# Patient Record
Sex: MALE | Race: BLACK OR AFRICAN AMERICAN | NOT HISPANIC OR LATINO | URBAN - METROPOLITAN AREA
[De-identification: names, ages, dates, MRNs, and addresses within clinical notes are randomized per-mention and may not be internally consistent; named-entity substitution may affect disease eponyms.]

---

## 2019-06-06 ENCOUNTER — EMERGENCY (EMERGENCY)
Facility: HOSPITAL | Age: 24
LOS: 1 days | Discharge: HOME | End: 2019-06-06
Attending: EMERGENCY MEDICINE | Admitting: EMERGENCY MEDICINE
Payer: SELF-PAY

## 2019-06-06 VITALS
SYSTOLIC BLOOD PRESSURE: 110 MMHG | TEMPERATURE: 98 F | DIASTOLIC BLOOD PRESSURE: 75 MMHG | RESPIRATION RATE: 16 BRPM | OXYGEN SATURATION: 97 % | HEART RATE: 74 BPM

## 2019-06-06 VITALS
HEART RATE: 78 BPM | OXYGEN SATURATION: 97 % | RESPIRATION RATE: 16 BRPM | SYSTOLIC BLOOD PRESSURE: 111 MMHG | TEMPERATURE: 99 F | DIASTOLIC BLOOD PRESSURE: 73 MMHG

## 2019-06-06 PROCEDURE — 71046 X-RAY EXAM CHEST 2 VIEWS: CPT

## 2019-06-06 PROCEDURE — 93005 ELECTROCARDIOGRAM TRACING: CPT

## 2019-06-06 PROCEDURE — 93010 ELECTROCARDIOGRAM REPORT: CPT

## 2019-06-06 PROCEDURE — 73110 X-RAY EXAM OF WRIST: CPT

## 2019-06-06 PROCEDURE — 99283 EMERGENCY DEPT VISIT LOW MDM: CPT | Mod: 25

## 2019-06-06 PROCEDURE — 73110 X-RAY EXAM OF WRIST: CPT | Mod: 26,RT

## 2019-06-06 PROCEDURE — 99284 EMERGENCY DEPT VISIT MOD MDM: CPT | Mod: 25

## 2019-06-06 PROCEDURE — 71046 X-RAY EXAM CHEST 2 VIEWS: CPT | Mod: 26

## 2019-06-06 RX ORDER — IBUPROFEN 200 MG
600 TABLET ORAL ONCE
Refills: 0 | Status: COMPLETED | OUTPATIENT
Start: 2019-06-06 | End: 2019-06-06

## 2019-06-06 RX ADMIN — Medication 600 MILLIGRAM(S): at 08:59

## 2019-06-06 RX ADMIN — Medication 600 MILLIGRAM(S): at 09:35
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
## 2019-06-06 NOTE — ED PROVIDER NOTE - NS ED ROS FT
Constitutional: - Fever, - Chills, - Anorexia, - Fatigue, - Night sweats  Eyes: - Discharge, - Irritation, - Redness, - Visual changes, - Light sensitivity, - Pain  EARS: - Ear Pain, - Tinnitus, - Decreased hearing  NOSE: - Congestion, - Bloody nose  MOUTH/THROAT: - Vocal Changes, - Drooling, - Sore throat  NECK: - Lumps, - Stiffness, - Pain  CV: - Palpitations, - Chest Pain, - Edema, - Syncope  RESP:  - Cough, - Shortness of Breath, - Dyspnea on Exertion, - Trouble speaking, - Pleuritic pain - Wheezing  GI: - Diarrhea, - Constipation, - Bloody stools, - Nausea, - Vomiting, - Abdominal Pain  : - Dysuria, -Frequency, - Hematuria, - Hesitancy, - Incontinence, - Saddle Anesthesia, - Abnormal discharge  MSK: - Myalgias, - Arthralgias, - Weakness, - Deformities, +wrist pain  SKIN: - Color change, - Rash, - Swelling, - Ecchymosis, - Abrasion, - laceration  NEURO: - Change in behavior, - Dec. Alertness, - Headache, - Dizziness, - Change in speech, - Weakness, - Seizure-like activity, - Difficulty ambulating Constitutional: - Fever, - Chills, - Anorexia, - Fatigue, - Night sweats  Eyes: - Discharge, - Irritation, - Redness, - Visual changes, - Light sensitivity, - Pain  EARS: - Ear Pain, - Tinnitus, - Decreased hearing  NOSE: - Congestion, - Bloody nose  MOUTH/THROAT: - Vocal Changes, - Drooling, - Sore throat  NECK: - Lumps, - Stiffness, - Pain  CV: - Palpitations, - Chest Pain, - Edema, - Syncope +chest wall pain  RESP:  - Cough, - Shortness of Breath, - Dyspnea on Exertion, - Trouble speaking, - Pleuritic pain - Wheezing  GI: - Diarrhea, - Constipation, - Bloody stools, - Nausea, - Vomiting, - Abdominal Pain  : - Dysuria, -Frequency, - Hematuria, - Hesitancy, - Incontinence, - Saddle Anesthesia, - Abnormal discharge  MSK: - Myalgias, - Arthralgias, - Weakness, - Deformities, +wrist pain  SKIN: - Color change, - Rash, - Swelling, - Ecchymosis, - Abrasion, - laceration  NEURO: - Change in behavior, - Dec. Alertness, - Headache, - Dizziness, - Change in speech, - Weakness, - Seizure-like activity, - Difficulty ambulating

## 2019-06-06 NOTE — ED PROVIDER NOTE - PHYSICAL EXAMINATION
Gen: Alert, NAD  Head/eyes: NC/AT, PERRL, EOMI, normal lids/conjunctiva, no scleral icterus  ENT: airway patent, +mild left medial nostril abrasion, no active bleeding  Neck: supple, no tenderness/meningismus/JVD, Trachea midline  Pulm/lung: Bilateral clear BS, normal resp effort, no wheeze/stridor/retractions  CV/heart: RRR, no M/R/G, +2 dist pulses (radial, pedal DP/PT, popliteal)  GI/Abd: soft, NT/ND, +BS, no guarding/rebound tenderness  Musculoskeletal: no edema/erythema/cyanosis, FROM in all extremities, no C/T/L spine ttp, +mild ttp right distal wrist  Skin: no rash, no vesicles, no petechaie, no ecchymosis, no swelling, no seat belt sign  Neuro: AAOx3, CN 2-12 intact, normal sensation, 5/5 motor strength in all extremities, normal gait, no dysmetria
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
## 2019-06-06 NOTE — ED ADULT NURSE NOTE - CHPI ED NUR SYMPTOMS NEG
no disorientation/no fussiness/no dizziness/no headache/no loss of consciousness/no neck tenderness/no crying

## 2022-01-14 NOTE — ED PROCEDURE NOTE - CPROC ED POST PROC CARE GUIDE1
Verbal/written post procedure instructions were given to patient/caregiver./Keep the cast/splint/dressing clean and dry./Instructed patient/caregiver regarding signs and symptoms of infection./Elevate the injured extremity as instructed./Instructed patient/caregiver to follow-up with primary care physician.
unable to perform
